# Patient Record
Sex: MALE | Race: WHITE | NOT HISPANIC OR LATINO | URBAN - METROPOLITAN AREA
[De-identification: names, ages, dates, MRNs, and addresses within clinical notes are randomized per-mention and may not be internally consistent; named-entity substitution may affect disease eponyms.]

---

## 2017-12-29 ENCOUNTER — AMBULATORY - HEALTHEAST (OUTPATIENT)
Dept: ADMINISTRATIVE | Facility: CLINIC | Age: 82
End: 2017-12-29

## 2017-12-29 ENCOUNTER — OFFICE VISIT - HEALTHEAST (OUTPATIENT)
Dept: GERIATRICS | Facility: CLINIC | Age: 82
End: 2017-12-29

## 2017-12-29 DIAGNOSIS — I10 ESSENTIAL HYPERTENSION: ICD-10-CM

## 2017-12-29 DIAGNOSIS — M16.0 OSTEOARTHRITIS OF BOTH HIPS: ICD-10-CM

## 2017-12-29 DIAGNOSIS — I50.23 ACUTE ON CHRONIC CLINICAL SYSTOLIC HEART FAILURE (H): ICD-10-CM

## 2017-12-29 DIAGNOSIS — M54.10 RADICULOPATHY OF LEG: ICD-10-CM

## 2017-12-29 DIAGNOSIS — I34.0 MITRAL REGURGITATION: ICD-10-CM

## 2017-12-29 DIAGNOSIS — M48.061 SPINAL STENOSIS OF LUMBAR REGION: ICD-10-CM

## 2017-12-29 DIAGNOSIS — N40.0 BPH (BENIGN PROSTATIC HYPERPLASIA): ICD-10-CM

## 2017-12-29 DIAGNOSIS — I48.91 ATRIAL FIBRILLATION (H): ICD-10-CM

## 2017-12-29 DIAGNOSIS — I65.29 CAROTID ARTERY OBSTRUCTION: ICD-10-CM

## 2017-12-29 DIAGNOSIS — R33.9 URINARY RETENTION: ICD-10-CM

## 2017-12-29 DIAGNOSIS — I25.10 ARTERIOSCLEROTIC HEART DISEASE: ICD-10-CM

## 2017-12-29 DIAGNOSIS — I25.5 ISCHEMIC CARDIOMYOPATHY: ICD-10-CM

## 2018-01-04 ENCOUNTER — OFFICE VISIT - HEALTHEAST (OUTPATIENT)
Dept: GERIATRICS | Facility: CLINIC | Age: 83
End: 2018-01-04

## 2018-01-04 DIAGNOSIS — N40.1 BENIGN PROSTATIC HYPERPLASIA WITH URINARY RETENTION: ICD-10-CM

## 2018-01-04 DIAGNOSIS — I50.22 CHRONIC SYSTOLIC CONGESTIVE HEART FAILURE (H): ICD-10-CM

## 2018-01-04 DIAGNOSIS — I25.5 ISCHEMIC CARDIOMYOPATHY: ICD-10-CM

## 2018-01-04 DIAGNOSIS — I34.0 NON-RHEUMATIC MITRAL REGURGITATION: ICD-10-CM

## 2018-01-04 DIAGNOSIS — R33.8 BENIGN PROSTATIC HYPERPLASIA WITH URINARY RETENTION: ICD-10-CM

## 2018-01-04 DIAGNOSIS — I25.10 ARTERIOSCLEROTIC HEART DISEASE: ICD-10-CM

## 2018-01-04 DIAGNOSIS — M48.062 SPINAL STENOSIS OF LUMBAR REGION WITH NEUROGENIC CLAUDICATION: ICD-10-CM

## 2018-01-04 DIAGNOSIS — M16.0 PRIMARY OSTEOARTHRITIS OF BOTH HIPS: ICD-10-CM

## 2018-01-04 DIAGNOSIS — I65.29 OBSTRUCTION OF CAROTID ARTERY, UNSPECIFIED LATERALITY: ICD-10-CM

## 2018-01-04 DIAGNOSIS — I48.91 ATRIAL FIBRILLATION (H): ICD-10-CM

## 2018-01-04 DIAGNOSIS — I10 ESSENTIAL HYPERTENSION: ICD-10-CM

## 2018-01-04 ASSESSMENT — MIFFLIN-ST. JEOR: SCORE: 1053.08

## 2018-01-06 ENCOUNTER — RECORDS - HEALTHEAST (OUTPATIENT)
Dept: LAB | Facility: CLINIC | Age: 83
End: 2018-01-06

## 2018-01-06 LAB
ANION GAP SERPL CALCULATED.3IONS-SCNC: 8 MMOL/L (ref 5–18)
BUN SERPL-MCNC: 38 MG/DL (ref 8–28)
CALCIUM SERPL-MCNC: 10.9 MG/DL (ref 8.5–10.5)
CHLORIDE BLD-SCNC: 99 MMOL/L (ref 98–107)
CO2 SERPL-SCNC: 31 MMOL/L (ref 22–31)
CREAT SERPL-MCNC: 1.42 MG/DL (ref 0.7–1.3)
GFR SERPL CREATININE-BSD FRML MDRD: 46 ML/MIN/1.73M2
GLUCOSE BLD-MCNC: 88 MG/DL (ref 70–125)
POTASSIUM BLD-SCNC: 4.1 MMOL/L (ref 3.5–5)
SODIUM SERPL-SCNC: 138 MMOL/L (ref 136–145)

## 2018-01-10 ENCOUNTER — OFFICE VISIT - HEALTHEAST (OUTPATIENT)
Dept: GERIATRICS | Facility: CLINIC | Age: 83
End: 2018-01-10

## 2018-01-10 DIAGNOSIS — I25.10 ARTERIOSCLEROTIC HEART DISEASE: ICD-10-CM

## 2018-01-10 DIAGNOSIS — I10 ESSENTIAL HYPERTENSION: ICD-10-CM

## 2018-01-10 DIAGNOSIS — I48.20 CHRONIC ATRIAL FIBRILLATION (H): ICD-10-CM

## 2018-01-10 DIAGNOSIS — N40.1 BENIGN PROSTATIC HYPERPLASIA WITH URINARY RETENTION: ICD-10-CM

## 2018-01-10 DIAGNOSIS — I65.29 OBSTRUCTION OF CAROTID ARTERY, UNSPECIFIED LATERALITY: ICD-10-CM

## 2018-01-10 DIAGNOSIS — I34.0 NON-RHEUMATIC MITRAL REGURGITATION: ICD-10-CM

## 2018-01-10 DIAGNOSIS — M16.0 PRIMARY OSTEOARTHRITIS OF BOTH HIPS: ICD-10-CM

## 2018-01-10 DIAGNOSIS — I50.22 CHRONIC SYSTOLIC CONGESTIVE HEART FAILURE (H): ICD-10-CM

## 2018-01-10 DIAGNOSIS — I25.5 ISCHEMIC CARDIOMYOPATHY: ICD-10-CM

## 2018-01-10 DIAGNOSIS — M48.062 SPINAL STENOSIS OF LUMBAR REGION WITH NEUROGENIC CLAUDICATION: ICD-10-CM

## 2018-01-10 DIAGNOSIS — R33.8 BENIGN PROSTATIC HYPERPLASIA WITH URINARY RETENTION: ICD-10-CM

## 2018-01-10 ASSESSMENT — MIFFLIN-ST. JEOR: SCORE: 1058.52

## 2018-01-16 ENCOUNTER — OFFICE VISIT - HEALTHEAST (OUTPATIENT)
Dept: GERIATRICS | Facility: CLINIC | Age: 83
End: 2018-01-16

## 2018-01-16 ENCOUNTER — RECORDS - HEALTHEAST (OUTPATIENT)
Dept: LAB | Facility: CLINIC | Age: 83
End: 2018-01-16

## 2018-01-16 DIAGNOSIS — I10 ESSENTIAL HYPERTENSION: ICD-10-CM

## 2018-01-16 DIAGNOSIS — I25.5 ISCHEMIC CARDIOMYOPATHY: ICD-10-CM

## 2018-01-16 DIAGNOSIS — I48.20 CHRONIC ATRIAL FIBRILLATION (H): ICD-10-CM

## 2018-01-16 DIAGNOSIS — I50.23 ACUTE ON CHRONIC CLINICAL SYSTOLIC HEART FAILURE (H): ICD-10-CM

## 2018-01-16 DIAGNOSIS — R33.9 URINARY RETENTION: ICD-10-CM

## 2018-01-16 LAB
ANION GAP SERPL CALCULATED.3IONS-SCNC: 8 MMOL/L (ref 5–18)
BUN SERPL-MCNC: 39 MG/DL (ref 8–28)
CALCIUM SERPL-MCNC: 11.4 MG/DL (ref 8.5–10.5)
CHLORIDE BLD-SCNC: 98 MMOL/L (ref 98–107)
CO2 SERPL-SCNC: 32 MMOL/L (ref 22–31)
CREAT SERPL-MCNC: 1.15 MG/DL (ref 0.7–1.3)
GFR SERPL CREATININE-BSD FRML MDRD: 59 ML/MIN/1.73M2
GLUCOSE BLD-MCNC: 94 MG/DL (ref 70–125)
POTASSIUM BLD-SCNC: 4.5 MMOL/L (ref 3.5–5)
SODIUM SERPL-SCNC: 138 MMOL/L (ref 136–145)

## 2018-01-18 ENCOUNTER — AMBULATORY - HEALTHEAST (OUTPATIENT)
Dept: GERIATRICS | Facility: CLINIC | Age: 83
End: 2018-01-18

## 2018-07-06 ENCOUNTER — RECORDS - HEALTHEAST (OUTPATIENT)
Dept: LAB | Facility: CLINIC | Age: 83
End: 2018-07-06

## 2018-07-06 LAB
ALBUMIN UR-MCNC: NEGATIVE MG/DL
APPEARANCE UR: CLEAR
BACTERIA #/AREA URNS HPF: ABNORMAL HPF
BILIRUB UR QL STRIP: NEGATIVE
COLOR UR AUTO: YELLOW
GLUCOSE UR STRIP-MCNC: NEGATIVE MG/DL
HGB UR QL STRIP: NEGATIVE
KETONES UR STRIP-MCNC: NEGATIVE MG/DL
LEUKOCYTE ESTERASE UR QL STRIP: ABNORMAL
MUCOUS THREADS #/AREA URNS LPF: ABNORMAL LPF
NITRATE UR QL: POSITIVE
PH UR STRIP: 8 [PH] (ref 4.5–8)
RBC #/AREA URNS AUTO: ABNORMAL HPF
SP GR UR STRIP: 1.01 (ref 1–1.03)
SQUAMOUS #/AREA URNS AUTO: ABNORMAL LPF
UROBILINOGEN UR STRIP-ACNC: ABNORMAL
WBC #/AREA URNS AUTO: ABNORMAL HPF

## 2018-07-09 LAB — BACTERIA SPEC CULT: ABNORMAL

## 2021-05-31 ENCOUNTER — RECORDS - HEALTHEAST (OUTPATIENT)
Dept: ADMINISTRATIVE | Facility: CLINIC | Age: 86
End: 2021-05-31

## 2021-05-31 VITALS — WEIGHT: 122 LBS | HEIGHT: 63 IN | BODY MASS INDEX: 21.62 KG/M2

## 2021-05-31 VITALS — WEIGHT: 120.8 LBS | HEIGHT: 63 IN | BODY MASS INDEX: 21.4 KG/M2

## 2021-06-15 NOTE — PROGRESS NOTES
[facility]      Facility:    [facility] [[id number]]    Code Status: DNR   [facility] 12/21-12/28/17       Chief Complaint/Reason for Visit:  Chief Complaint   Patient presents with     H & P     Acute CHF       HPI:   Main is a 89 y.o. male who known history of chronic systolic CHF, ischemic cardiomyopathy, hypertension.  Abdominal aortic aneurysm who presents with several day history of fatigue, abdominal bloating, leg swelling.  He saw his primary care physician, and was sent to the emergency room because he had a heart rate in the 30s-40s.    He presented to the emergency department, his brain natruretic protein was 3100, x-ray showing mild pulmonary edema, ECG was sinus bradycardia.  He had been on a beta-blocker.    He was seen by cardiology.  Beta-blockers did not recommend further use of beta-blockers.  His heart rates increase, no pacer was deemed necessary because he was asymptomatic and the rhythm was stable.  Repeat echo showed a decrease in ejection fraction to 30-35%.  He diuresed well.    He developed atrial fibrillation on 12/25, he was not given anticoagulation due to risk of bleeding, but resumed on aspirin.  Was recommended he follow-up with his cardiologist, Dr. Landon, in 2-3 weeks for a Holter monitor.    He had hematuria, has a chronic suprapubic catheter.  Urology saw him, continuous bladder irrigation was done for about 24 hours.  It was recommended he have cystoscopy as an outpatient in a couple weeks.  Hospital dictation states urine culture was positive, no discussion of the organism involved.  When he had purplish discoloration of his feet with walking, arterial Doppler was normal.    Past Medical History:  Past Medical History:   Diagnosis Date     AAA (abdominal aortic aneurysm)      Abnormal liver function      Acute MI      Acute on chronic clinical systolic heart failure      Arteriosclerotic heart disease      Atrial fibrillation      AV  block, 3rd degree      Bladder obstruction      BPH (benign prostatic hyperplasia)      Carotid artery obstruction     right, common     Gallbladder polyp      Hematuria      Hemorrhoids      [place] (hard of hearing), bilateral     bilat hearing aides     Hyperlipidemia      Hyperparathyroidism      Ischemic cardiomyopathy      Left inguinal hernia      Malaise and fatigue      Migraine with aura      Mitral regurgitation     moderate     Non-STEMI (non-ST elevated myocardial infarction)      Occlusion and stenosis of carotid artery without mention of cerebral infarction      Osteoarthritis of both hips      Pain in right shoulder      Pain of upper abdomen      Personal history of colonic polyps      Pulmonary emphysema      Radiculopathy of leg     left, due to lumbar spinal stenosis impinging L3-5     Senile osteoporosis      Spinal stenosis of lumbar region      Unspecified essential hypertension      Urinary retention      UTI (lower urinary tract infection)      Vertigo      Weight loss            Surgical History:  Past Surgical History:   Procedure Laterality Date     ANGIOPLASTY       APPENDECTOMY       basal cell cancer removal      face     CAROTID ENDARTERECTOMY Right      CATARACT EXTRACTION Bilateral      CORONARY STENT PLACEMENT  2003, 6/16/08     FINGER SURGERY       HERNIA REPAIR       INGUINAL HERNIA REPAIR Left 10/26/2015    Procedure: HERNIA REPAIR INGUINAL LEFT ;  Surgeon: José Miguel Kwok MD;  Location: [facility];  Service:      suprapubic catheter placement  10/01/2015     TONSILLECTOMY         Family History:   CAD  Social History:    Social History     Social History     Marital status:      Spouse name: N/A     Number of children: N/A     Years of education: N/A     Social History Main Topics     Smoking status: Never Smoker     Smokeless tobacco: Never Used     Alcohol use No     Drug use: No     Sexual activity: Not on file     Other Topics Concern     Not on file     Social  History Narrative          Review of Systems    Had a very carefully controlled diet his whole life, he is a bit disappointed his heart has some troubles.  He had many negative experiences with the transferred to the transitional care unit.  He does not want to have a shared bathroom (double room)  He is hard to hearing  He has significant constipation had 2 very hard stools this morning  Has eczema on his right chest which can be problematic  He feels his breathing is close to baseline, but he is still more fatigued than his baseline  The remainder of the comprehensive review of systems is negative.      Vitals:    12/29/17 0900   BP: 110/68   Pulse: 66   Resp: 18   Temp: 98.2  F (36.8  C)   SpO2: 98%       Physical Exam   Constitutional: He is oriented to person, place, and time.   HENT:   Nose: Nose normal.   Mouth/Throat: Oropharynx is clear and moist.   Bilateral hearing aids   Eyes: Conjunctivae and EOM are normal. No scleral icterus.   Cardiovascular: Intact distal pulses.    Murmur heard.  Irregularly irregular  Soft apical systolic murmur   Pulmonary/Chest: Breath sounds normal. He has no wheezes. He has no rales.   Abdominal: Bowel sounds are normal. He exhibits no distension. There is no tenderness.   Genitourinary:   Genitourinary Comments: Suprapubic catheter site clean, urine medium yellow no mucus shreds   Musculoskeletal: He exhibits no edema.   Feet cool, purplish pink coloration with feet in a semi-dependent position   Lymphadenopathy:     He has no cervical adenopathy.   Neurological: He is alert and oriented to person, place, and time. Coordination normal.   Skin: Skin is warm and dry.   No ana eczema on the right chest  Right hand fingernails hypertrophic/ thick   Psychiatric: He has a normal mood and affect. His behavior is normal. Judgment and thought content normal.       Medication List:  Current Outpatient Prescriptions   Medication Sig     aspirin 81 mg chewable tablet Chew 81 mg daily.  "    clotrimazole-betamethasone (LOTRISONE) cream Apply 1 application topically 2 (two) times a day.     furosemide (LASIX) 20 MG tablet Take 40 mg by mouth 2 (two) times a day at 9am and 6pm.      lisinopril (PRINIVIL,ZESTRIL) 10 MG tablet Take 15 mg by mouth 2 (two) times a day.     multivitamin capsule Take 1 capsule by mouth daily.     nitroglycerin (NITROSTAT) 0.4 MG SL tablet Place 0.4 mg under the tongue every 5 (five) minutes as needed for chest pain.     polyethylene glycol (MIRALAX) 17 gram packet Take 17 g by mouth daily as needed.     sulfamethoxazole-trimethoprim (SEPTRA DS) 800-160 mg per tablet Take 1 tablet by mouth 2 (two) times a day.     tolnaftate (ANTIFUNGAL, TOLNAFTATE,) 1 % powder Apply 1 application topically once a week. On Mondays, and qd prn       Labs:  12/29/17  White count 8.9, hemoglobin 14.5, platelets 187  Sodium 137, potassium 4.2, chloride 97, CO2 33, BUN 27, creatinine 1.44, previous was 0.99        Assessment:    ICD-10-CM    1. Acute on chronic clinical systolic heart failure I50.23    2. Arteriosclerotic heart disease I25.10    3. Atrial fibrillation I48.91    4. Ischemic cardiomyopathy I25.5    5. Essential hypertension I10    6. Urinary retention R33.9    7. Spinal stenosis of lumbar region M48.061    8. Radiculopathy of leg M54.10    9. Osteoarthritis of both hips M16.0    10. Mitral regurgitation I34.0    11. BPH (benign prostatic hyperplasia) N40.0    12. Carotid artery obstruction I65.29          Plan:  Compensated heart failure  Deconditioned : therapiies  Bradycardia: Resolved off the atenolol  Urinary obstruction/hematuria in the hospital: Hematuria not present currently, to see urologist for cystoscopy as outpt    Discharge instructions \"follow-up pancreatic mass with CT of the abdomen in 2 years !\"  there was no mention of a pancreatic mass or the results of his CT, details unknown    Total time 65 min, > 50 % face to face w patient and family about the " hospitalization and issues with SNF  Electronically signed by: Berta Teresa MD

## 2021-06-15 NOTE — PROGRESS NOTES
[facility]    Facility:   [facility] [[id number]]     Code Status: DNR  PCP: Jasson Ballard MD   Phone: [phone]   Fax: [phone]      CHIEF COMPLAINT/REASON FOR VISIT:  Chief Complaint   Patient presents with     Discharge Summary     systolic CHF       HISTORY COURSE:  Mr. Gaona has known chronic systolic CHF, ischemic cardiomyopathy.  He had increasing leg swelling, abdominal bloating, and leg swelling.  He was found to be bradycardic and was sent to the emergency department.    X-ray showed mild pulmonary edema, BNP was 3100.  Cardiology did not recommend further beta-blocker use they were stopped.  His heart rate increase, it was not deemed necessary to have a pacer since he was asymptomatic.  He had an ejection fraction = 30-35%.    He had known atrial fibrillation, was on aspirin.  He should follow-up with his cardiologist Dr. Landon for Holter monitor    He had hematuria from his chronic suprapubic catheter, he cleared after bladder irrigation in the hospital.  Was recommended that he follow-up with his urologist for cystoscopy.      Review of Systems   Feels ready for discharge home,back not itchy, no complaints      Vitals:    01/15/18 0900   BP: 102/62   Pulse: (!) 58   Resp: 18   Temp: 97.8  F (36.6  C)   SpO2: 98%       Physical Exam   Cardiovascular:   Irregularly irregular, Soft apical systolic murmur   Pulmonary/Chest: Breath sounds normal. He has no wheezes. He has no rales.   Abdominal: Bowel sounds are normal, no distension, no tenderness.   Suprapubic catheter site without inflamation, urine medium yellow, no sediment   Musculoskeletal: He exhibits no edema. Good strength 4+ in extremities  Neurological: He is alert and oriented to person, place, and time.Coordination normal.   Skin: Skin is warm and dry.    Mood : calm, pleasant    MEDICATION LIST:  Current Outpatient Prescriptions   Medication Sig     aspirin 81 mg chewable tablet Chew 81 mg daily.      furosemide (LASIX) 20 MG tablet Take 20 mg by mouth 2 (two) times a day.      lisinopril (PRINIVIL,ZESTRIL) 10 MG tablet Take 10 mg by mouth 2 (two) times a day.      multivitamin capsule Take 1 capsule by mouth daily.     polyethylene glycol (MIRALAX) 17 gram packet Take 17 g by mouth 3 (three) times a week. Monday, Wednesday, and Friday     senna-docusate (SENNOSIDES-DOCUSATE SODIUM) 8.6-50 mg tablet Take 1 tablet by mouth 2 (two) times a day.     clotrimazole-betamethasone (LOTRISONE) cream Apply 1 application topically 2 (two) times a day.     diphenhydrAMINE (BENADRYL) 50 MG tablet Take 25 mg by mouth 3 (three) times a day as needed for itching.     nitroglycerin (NITROSTAT) 0.4 MG SL tablet Place 0.4 mg under the tongue every 5 (five) minutes as needed for chest pain.     tolnaftate (ANTIFUNGAL, TOLNAFTATE,) 1 % powder Apply 1 application topically once a week. On Mondays, and qd prn       DISCHARGE DIAGNOSIS:    ICD-10-CM    1. Acute on chronic clinical systolic heart failure I50.23    2. Ischemic cardiomyopathy I25.5    3. Chronic atrial fibrillation I48.2    4. Urinary retention / suprapubic catheter R33.9    5. Essential hypertension I10        MEDICAL EQUIPMENT NEEDS:  NONE    DISCHARGE PLAN/FACE TO FACE:  I certify that services are/were furnished while this patient was under the care of a physician and that a physician or an allowed non-physician practitioner (NPP), had a face-to-face encounter that meets the physician face-to-face encounter requirements. The encounter was in whole, or in part, related to the primary reason for home health. The patient is confined to his/her home and needs intermittent skilled nursing, physical therapy, speech-language pathology, or the continued need for occupational therapy. A plan of care has been established by a physician and is periodically reviewed by a physician.  Date of Face-to-Face Encounter: 1/16/18    I certify that, based on my findings, the following  services are medically necessary home health services:   - Home PT   - Home health nurse through [facility]    My clinical findings support the need for the above skilled services because: He has some weakness with ambulation, needs a home safety evaluation.  He had previous nursing services for suprapubic catheter care, and he will continue with this.      This patient is homebound because: Weakness.  Part of his treatment is his home  evaluation    The patient is, or has been, under my care and I have initiated the establishment of the plan of care. This patient will be followed by a physician who will periodically review the plan of care.      Electronically signed by: Berta Teresa MD

## 2021-06-15 NOTE — PROGRESS NOTES
[facility]    Name:   Main Gaona (Brando)  : 1920  Facility:   [facility] [[id number]]   Room: TCU2-209  Code Status: DNR -   Fac type:   SNF (Skilled Nursing Facility, TCU) -     CHIEF COMPLAINT / REASON FOR VISIT:  Chief Complaint   Patient presents with     Review Of Multiple Medical Conditions     TCU follow-up after hospitalization with a CHF exacerbation.    Patient was last seen by Dr. Teresa for an admission visit on 17    HPI: Main is a 89 y.o. male with a known history of chronic systolic heart failure, ischemic cardiomyopathy, hypertension, and urinary retention (secondary to BPH).  He presented to his PCP following several days of fatigue, abdominal bloating, and leg swelling and was sent to the ED with a heart rate in the 30s-40s.      In the ED, his BNP was 3100 with x-rays showing mild pulmonary edema.  An ECG showed sinus bradycardia.  He had been on a beta-blocker.  He was seen by cardiology, and they did not recommend further use of beta-blockers.  His heart rate increased, and no pacer was deemed necessary because he was asymptomatic, and the rhythm was stable.  A repeat echocardiogram showed a decrease in ejection fraction to 30-35%.  He was diuresed well.    On 17, he developed atrial fibrillation, but he was not given anticoagulation due to risk of bleeding.  He was, however, resumed on aspirin.  It was recommended that he follow-up with his cardiologist, Dr. Landon, in 2-3 weeks for a Holter monitor.    He has a chronic suprapubic catheter due to urinary retention/BPH, and he did have hematuria.  Urology saw him, and he underwent continuous bladder irrigation for about 24 hours.  It was recommended to have cystoscopy as an outpatient in a few weeks.  Hospital dictation states the urine culture was positive, but there was no discussion of the organism involved.  When he had a purplish discoloration of his feet with walking, and  arterial Doppler was normal.      CURRENT ISSUES    A current issue for him is constipation, complaining of very hard stools.  Dr. Teresa changed his MiraLAX from daily as needed to scheduled Mondays, Wednesdays, and Fridays.  She also ordered senna S1 twice daily and for that day a glycerin suppository.    Because his creatinine was elevated, Dr. Teresa decreased his lisinopril from 15 mg twice daily to 10 mg twice daily.  A follow-up metabolic profile is scheduled for 01/06/18.    He is also been complaining of the chest rash and, on 01/01/18, we ordered Benadryl 25 mg 3 times daily as needed.    ROS: Today, he is complaining of pain under the left rib cage that started last night while getting into bed.  Currently, it is diminished, but I suspect it is no more than a muscle cramp.  No headaches or chest pains, coughing or congestion, dizziness, dyspnea, dyspnea on exertion, or orthopnea.  He does have an excessive amount of thick phlegm with no real way to correct this.  No problems with sleep.  He has some right heel pain, and pressure is being relieved with pillows.    Past Medical History:   Diagnosis Date     AAA (abdominal aortic aneurysm)      Abnormal liver function      Acute MI      Acute on chronic clinical systolic heart failure      Arteriosclerotic heart disease      Atrial fibrillation      AV block, 3rd degree      Bladder obstruction      BPH (benign prostatic hyperplasia)      Carotid artery obstruction     right, common     Gallbladder polyp      Hematuria      Hemorrhoids      [place] (hard of hearing), bilateral     bilat hearing aides     Hyperlipidemia      Hyperparathyroidism      Ischemic cardiomyopathy      Left inguinal hernia      Malaise and fatigue      Migraine with aura      Mitral regurgitation     moderate     Non-STEMI (non-ST elevated myocardial infarction)      Occlusion and stenosis of carotid artery without mention of cerebral infarction      Osteoarthritis of both hips       Pain in right shoulder      Pain of upper abdomen      Personal history of colonic polyps      Pulmonary emphysema      Radiculopathy of leg     left, due to lumbar spinal stenosis impinging L3-5     Senile osteoporosis      Spinal stenosis of lumbar region      Unspecified essential hypertension      Urinary retention      UTI (lower urinary tract infection)      Vertigo      Weight loss               No family history on file.  Social History     Social History     Marital status:      Spouse name: N/A     Number of children: N/A     Years of education: N/A     Social History Main Topics     Smoking status: Never Smoker     Smokeless tobacco: Never Used     Alcohol use No     Drug use: No     Sexual activity: Not on file     Other Topics Concern     Not on file     Social History Narrative     MEDICATIONS: Reviewed from the MAR, physician orders, and earlier progress notes.  Updated by me today (01/04/18) with the addition of Benadryl and adjustments made by Dr. Teresa reflected below.  Current Outpatient Prescriptions   Medication Sig     diphenhydrAMINE (BENADRYL) 50 MG tablet Take 25 mg by mouth 3 (three) times a day as needed for itching.     senna-docusate (SENNOSIDES-DOCUSATE SODIUM) 8.6-50 mg tablet Take 1 tablet by mouth 2 (two) times a day.     aspirin 81 mg chewable tablet Chew 81 mg daily.     clotrimazole-betamethasone (LOTRISONE) cream Apply 1 application topically 2 (two) times a day.     furosemide (LASIX) 20 MG tablet Take 40 mg by mouth 2 (two) times a day at 9am and 6pm.      lisinopril (PRINIVIL,ZESTRIL) 10 MG tablet Take 10 mg by mouth 2 (two) times a day.      multivitamin capsule Take 1 capsule by mouth daily.     nitroglycerin (NITROSTAT) 0.4 MG SL tablet Place 0.4 mg under the tongue every 5 (five) minutes as needed for chest pain.     polyethylene glycol (MIRALAX) 17 gram packet Take 17 g by mouth 3 (three) times a week. Monday, Wednesday, and Friday     tolnaftate (ANTIFUNGAL,  "TOLNAFTATE,) 1 % powder Apply 1 application topically once a week. On Mondays, and qd prn     ALLERGIES:   Allergies   Allergen Reactions     Statins-Hmg-Coa Reductase Inhibitors Other (See Comments)     Refuses to use due to possibility of side effects.  Had diarrhea.     Atorvastatin Diarrhea     Nizoral [Ketoconazole] Other (See Comments)     Joint pain     Reopro [Abciximab] Other (See Comments)     Almost bled out     Simvastatin Other (See Comments)     Muscle weakness     DIET: Cardiac, regular texture, thin liquids.    Vitals:    01/04/18 1719   BP: 108/65   Pulse: 70   Resp: 22   Temp: 97.5  F (36.4  C)   Weight: 120 lb 12.8 oz (54.8 kg)   Height: 5' 3\" (1.6 m)     Body mass index is 21.4 kg/(m^2).    EXAMINATION:   General: Cachectic appearing elderly male, sitting in a easy chair, in no apparent distress.  Head: Normocephalic and atraumatic.   Eyes: PERRLA, sclerae clear.  His vision is good with corrective lenses.  ENT: Moist oral mucosa.  He has his own teeth.  No rhinorrhea or nasal discharge.  There is significant hearing loss, and he has binaural hearing aids.  Cardiovascular: Irregularly irregular rhythm without an appreciable murmur.  Respiratory: Lungs clear to auscultation bilaterally.   Abdomen: Soft and nontender.   Musculoskeletal/Extremities: Age-related degenerative joint disease.  Currently, no peripheral edema.  Neurologic: Slight bilateral upper extremity tremor.  Integument: Chest rash is suspected to be stress related pruritus.  The right hand has thickened fungal fingernails.  Cognitive/Psychiatric: Alert and oriented ×3.  Affect is euthymic.    DIAGNOSTICS:   Results for orders placed or performed in visit on 12/29/17   Basic Metabolic Panel   Result Value Ref Range    Sodium 137 136 - 145 mmol/L    Potassium 4.2 3.5 - 5.0 mmol/L    Chloride 97 (L) 98 - 107 mmol/L    CO2 33 (H) 22 - 31 mmol/L    Anion Gap, Calculation 7 5 - 18 mmol/L    Glucose 79 70 - 125 mg/dL    Calcium 10.4 8.5 - " 10.5 mg/dL    BUN 27 8 - 28 mg/dL    Creatinine 1.44 (H) 0.70 - 1.30 mg/dL    GFR MDRD Af Amer 55 (L) >60 mL/min/1.73m2    GFR MDRD Non Af Amer 45 (L) >60 mL/min/1.73m2     Lab Results   Component Value Date    WBC 8.9 12/29/2017    HGB 14.5 12/29/2017    HCT 45.6 12/29/2017    MCV 96 12/29/2017     12/29/2017     CrCl cannot be calculated (Patient's most recent sCr result is older than the maximum 5 days allowed.).  No results found for: BNP    ASSESSMENT/Plan:      ICD-10-CM    1. Arteriosclerotic heart disease I25.10    2. Chronic systolic congestive heart failure I50.22    3. Atrial fibrillation I48.91    4. Ischemic cardiomyopathy I25.5    5. Essential hypertension I10    6. Benign prostatic hyperplasia with urinary retention N40.1     R33.8    7. Spinal stenosis of lumbar region with neurogenic claudication M48.062    8. Primary osteoarthritis of both hips M16.0    9. Non-rheumatic mitral regurgitation I34.0    10. Obstruction of carotid artery, unspecified laterality I65.29      CHANGES:    None.    CARE PLAN:    The care plan has been reviewed and all orders signed. Changes to care plan, if any, as noted. Otherwise, continue care plan of care.    The above has been created using voice recognition software. Please be aware that this may unintentionally  produce inaccuracies and/or nonsensical sentences.      Electronically signed by: Buck Boyd CNP

## 2021-06-15 NOTE — PROGRESS NOTES
[facility]    Name:   Main Gaona (Brando)  : 1920  Facility:   [facility] [[id number]]   Room: TCU2-209  Code Status: DNR -   Fac type:   SNF (Skilled Nursing Facility, TCU) -     CHIEF COMPLAINT / REASON FOR VISIT:  Chief Complaint   Patient presents with     Review Of Multiple Medical Conditions     TCU follow-up after hospitalization with a CHF exacerbation.    Patient was last seen by me on 18 and subsequently seen by Dr. Teresa on 18.    HPI: Main is a 89 y.o. male with a known history of chronic systolic heart failure, ischemic cardiomyopathy, hypertension, and urinary retention (secondary to BPH).  He presented to his PCP following several days of fatigue, abdominal bloating, and leg swelling and was sent to the ED with a heart rate in the 30s-40s.      In the ED, his BNP was 3100 with x-rays showing mild pulmonary edema.  An ECG showed sinus bradycardia.  He had been on a beta-blocker.  He was seen by cardiology, and they did not recommend further use of beta-blockers.  His heart rate increased, and no pacer was deemed necessary because he was asymptomatic, and the rhythm was stable.  A repeat echocardiogram showed a decrease in ejection fraction to 30-35%.  He diuresed well.    On 17, he developed atrial fibrillation, but he was not given anticoagulation due to risk of bleeding.  He was, however, resumed on aspirin.  It was recommended that he follow-up with his cardiologist, Dr. Landon, in 2-3 weeks for a Holter monitor.    He has a chronic suprapubic catheter due to urinary retention/BPH, and he did have hematuria.  Urology saw him, and he underwent continuous bladder irrigation for about 24 hours.  It was recommended to have cystoscopy as an outpatient in a few weeks.  Hospital dictation states the urine culture was positive, but there was no discussion of the organism involved.  When he had a purplish discoloration of his feet with  walking, and arterial Doppler was normal.      CURRENT ISSUES    He had complained of constipation with very hard stools.  Dr. Teresa changed his MiraLAX from daily as needed to scheduled Mondays, Wednesdays, and Fridays, and also ordered senna S1 twice daily and for that day a glycerin suppository.  Bowel movements have improved.    Because his creatinine was elevated, Dr. Teresa decreased his lisinopril from 15 mg twice daily to 10 mg twice daily.  A follow-up metabolic profile was scheduled for 01/06/18 and, on 01/09/18, she decreased his Lasix from 40 mg twice daily to 40 mg every morning/20 mg every afternoon.  His BUN had climbed from 29-38.  Creatinine, however, was essentially unchanged (144).    He had also been complaining of the chest rash and, on 01/01/18, we ordered Benadryl 25 mg 3 times daily as needed.  This has resolved.     He did mention the possibility of discharging on 01/16/18.    ROS: Earlier, he had complained of pain under the left rib cage that started one night while getting into bed.  It has since resolved.  No headaches or chest pains, coughing or congestion, dizziness, dyspnea, dyspnea on exertion, or orthopnea.  He does have an excessive amount of thick phlegm with no real way to correct this.  No problems with sleep.  He has some right heel pain, and pressure is being relieved with pillows.    Past Medical History:   Diagnosis Date     AAA (abdominal aortic aneurysm)      Abnormal liver function      Acute MI      Acute on chronic clinical systolic heart failure      Arteriosclerotic heart disease      Atrial fibrillation      AV block, 3rd degree      Bladder obstruction      BPH (benign prostatic hyperplasia)      Carotid artery obstruction     right, common     Gallbladder polyp      Hematuria      Hemorrhoids      [place] (hard of hearing), bilateral     bilat hearing aides     Hyperlipidemia      Hyperparathyroidism      Ischemic cardiomyopathy      Left inguinal hernia       Malaise and fatigue      Migraine with aura      Mitral regurgitation     moderate     Non-STEMI (non-ST elevated myocardial infarction)      Occlusion and stenosis of carotid artery without mention of cerebral infarction      Osteoarthritis of both hips      Pain in right shoulder      Pain of upper abdomen      Personal history of colonic polyps      Pulmonary emphysema      Radiculopathy of leg     left, due to lumbar spinal stenosis impinging L3-5     Senile osteoporosis      Spinal stenosis of lumbar region      Unspecified essential hypertension      Urinary retention      UTI (lower urinary tract infection)      Vertigo      Weight loss               No family history on file.  Social History     Social History     Marital status:      Spouse name: N/A     Number of children: N/A     Years of education: N/A     Social History Main Topics     Smoking status: Never Smoker     Smokeless tobacco: Never Used     Alcohol use No     Drug use: No     Sexual activity: Not on file     Other Topics Concern     Not on file     Social History Narrative     MEDICATIONS: Reviewed from the MAR, physician orders, and earlier progress notes.  Updated by me today (01/04/18) with the addition of Benadryl and adjustments made by Dr. Teresa reflected below.  Current Outpatient Prescriptions   Medication Sig     aspirin 81 mg chewable tablet Chew 81 mg daily.     clotrimazole-betamethasone (LOTRISONE) cream Apply 1 application topically 2 (two) times a day.     diphenhydrAMINE (BENADRYL) 50 MG tablet Take 25 mg by mouth 3 (three) times a day as needed for itching.     furosemide (LASIX) 20 MG tablet Take 20 mg by mouth 2 (two) times a day. 40 mg every morning and 20 mg at 1400.     lisinopril (PRINIVIL,ZESTRIL) 10 MG tablet Take 10 mg by mouth 2 (two) times a day.      multivitamin capsule Take 1 capsule by mouth daily.     nitroglycerin (NITROSTAT) 0.4 MG SL tablet Place 0.4 mg under the tongue every 5 (five) minutes as  "needed for chest pain.     polyethylene glycol (MIRALAX) 17 gram packet Take 17 g by mouth 3 (three) times a week. Monday, Wednesday, and Friday     senna-docusate (SENNOSIDES-DOCUSATE SODIUM) 8.6-50 mg tablet Take 1 tablet by mouth 2 (two) times a day.     tolnaftate (ANTIFUNGAL, TOLNAFTATE,) 1 % powder Apply 1 application topically once a week. On Mondays, and qd prn     ALLERGIES:   Allergies   Allergen Reactions     Statins-Hmg-Coa Reductase Inhibitors Other (See Comments)     Refuses to use due to possibility of side effects.  Had diarrhea.     Atorvastatin Diarrhea     Nizoral [Ketoconazole] Other (See Comments)     Joint pain     Reopro [Abciximab] Other (See Comments)     Almost bled out     Simvastatin Other (See Comments)     Muscle weakness     DIET: Cardiac, regular texture, thin liquids.    Vitals:    01/10/18 1410   BP: 108/56   Pulse: 69   Resp: 22   Temp: 99.1  F (37.3  C)   Weight: 122 lb (55.3 kg)   Height: 5' 3\" (1.6 m)     Body mass index is 21.61 kg/(m^2).    EXAMINATION:   General: Cachectic appearing elderly male, sitting in a easy chair, in no apparent distress.  Head: Normocephalic and atraumatic.   Eyes: PERRLA, sclerae clear.  His vision is good with corrective lenses.  ENT: Moist oral mucosa.  He has his own teeth.  No rhinorrhea or nasal discharge.  There is significant hearing loss, and he has binaural hearing aids.  Cardiovascular: Irregularly irregular rhythm without an appreciable murmur.  Respiratory: Lungs clear to auscultation bilaterally.   Abdomen: Soft and nontender.   Musculoskeletal/Extremities: Age-related degenerative joint disease.  Currently, no peripheral edema.  Neurologic: Slight bilateral upper extremity tremor.  Integument: Chest rash is suspected to be stress related pruritus.  The right hand has thickened fungal fingernails.  Cognitive/Psychiatric: Alert and oriented ×3.  Affect is euthymic.    DIAGNOSTICS:   Results for orders placed or performed in visit on " 01/06/18   Basic Metabolic Panel   Result Value Ref Range    Sodium 138 136 - 145 mmol/L    Potassium 4.1 3.5 - 5.0 mmol/L    Chloride 99 98 - 107 mmol/L    CO2 31 22 - 31 mmol/L    Anion Gap, Calculation 8 5 - 18 mmol/L    Glucose 88 70 - 125 mg/dL    Calcium 10.9 (H) 8.5 - 10.5 mg/dL    BUN 38 (H) 8 - 28 mg/dL    Creatinine 1.42 (H) 0.70 - 1.30 mg/dL    GFR MDRD Af Amer 56 (L) >60 mL/min/1.73m2    GFR MDRD Non Af Amer 46 (L) >60 mL/min/1.73m2     Lab Results   Component Value Date    WBC 8.9 12/29/2017    HGB 14.5 12/29/2017    HCT 45.6 12/29/2017    MCV 96 12/29/2017     12/29/2017     Estimated Creatinine Clearance: 23.3 mL/min (by C-G formula based on Cr of 1.42).  No results found for: BNP    ASSESSMENT/Plan:      ICD-10-CM    1. Arteriosclerotic heart disease I25.10    2. Chronic systolic congestive heart failure I50.22    3. Chronic atrial fibrillation I48.2    4. Ischemic cardiomyopathy I25.5    5. Essential hypertension I10    6. Benign prostatic hyperplasia with urinary retention N40.1     R33.8    7. Spinal stenosis of lumbar region with neurogenic claudication M48.062    8. Primary osteoarthritis of both hips M16.0    9. Non-rheumatic mitral regurgitation I34.0    10. Obstruction of carotid artery, unspecified laterality I65.29      CHANGES:    None.    CARE PLAN:    The care plan has been reviewed and all orders signed. Changes to care plan, if any, as noted. Otherwise, continue care plan of care.    The above has been created using voice recognition software. Please be aware that this may unintentionally  produce inaccuracies and/or nonsensical sentences.      Electronically signed by: Buck Boyd CNP